# Patient Record
Sex: MALE | Race: WHITE
[De-identification: names, ages, dates, MRNs, and addresses within clinical notes are randomized per-mention and may not be internally consistent; named-entity substitution may affect disease eponyms.]

---

## 2018-05-01 ENCOUNTER — HOSPITAL ENCOUNTER (EMERGENCY)
Dept: HOSPITAL 26 - MED | Age: 68
LOS: 1 days | Discharge: HOME | End: 2018-05-02
Payer: COMMERCIAL

## 2018-05-01 VITALS
HEIGHT: 65 IN | DIASTOLIC BLOOD PRESSURE: 85 MMHG | SYSTOLIC BLOOD PRESSURE: 171 MMHG | WEIGHT: 164.19 LBS | BODY MASS INDEX: 27.36 KG/M2
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
## 2018-05-01 DIAGNOSIS — S02.40EA: Primary | ICD-10-CM

## 2018-05-01 DIAGNOSIS — I10: ICD-10-CM

## 2018-05-01 DIAGNOSIS — R22.0: ICD-10-CM

## 2018-05-01 DIAGNOSIS — R51: ICD-10-CM
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
## 2018-05-02 VITALS — DIASTOLIC BLOOD PRESSURE: 67 MMHG | SYSTOLIC BLOOD PRESSURE: 130 MMHG
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
## 2018-05-02 NOTE — NUR
Patient discharged with v/s stable. Written and verbal after care instructions 
given and explained. 

Patient alert, oriented and verbalized understanding of instructions. 
Ambulatory with steady gait. All questions addressed prior to discharge. ID 
band removed. Patient advised to follow up with PMD. Rx of ACETAMINOPHEN 500MG 
given. Patient educated on indication of medication including possible reaction 
and side effects. Opportunity to ask questions provided and answered.

## 2018-05-02 NOTE — NUR
CALLED PT DAUGHTER TO INFORMED HER OF PT'S DISCHARGE. PER "MAVIS" PT'S 
DAUGHTER SHE WILL  PT SHORLY. PT, ER MD AND  NURSE MADE AWARE.

## 2018-05-02 NOTE — NUR
PT TO CT IN STABLE CONTITION VIA [facility] WITH CT TECH 

-------------------------------------------------------------------------------

Addendum: 05/02/18 at 0211 by LIANG

-------------------------------------------------------------------------------

STABLE CONDITION

## 2024-12-12 NOTE — NUR
-------------------------------------------------------------------------------

            *** Note undone in [facility] - 05/02/18 at 0125 by MAGO ***            

-------------------------------------------------------------------------------

PT TAKEN TO CT DISPLAY PLAN FREE TEXT